# Patient Record
Sex: MALE | Race: WHITE
[De-identification: names, ages, dates, MRNs, and addresses within clinical notes are randomized per-mention and may not be internally consistent; named-entity substitution may affect disease eponyms.]

---

## 2018-06-04 ENCOUNTER — HOSPITAL ENCOUNTER (OUTPATIENT)
Dept: HOSPITAL 8 - CFH | Age: 58
Discharge: HOME | End: 2018-06-04
Attending: NURSE PRACTITIONER
Payer: MEDICARE

## 2018-06-04 DIAGNOSIS — M51.36: Primary | ICD-10-CM

## 2018-06-04 PROCEDURE — 73523 X-RAY EXAM HIPS BI 5/> VIEWS: CPT

## 2018-06-04 PROCEDURE — 72114 X-RAY EXAM L-S SPINE BENDING: CPT

## 2019-11-13 ENCOUNTER — HOSPITAL ENCOUNTER (EMERGENCY)
Dept: HOSPITAL 8 - ED | Age: 59
Discharge: HOME | End: 2019-11-13
Payer: MEDICARE

## 2019-11-13 VITALS — DIASTOLIC BLOOD PRESSURE: 88 MMHG | SYSTOLIC BLOOD PRESSURE: 156 MMHG

## 2019-11-13 VITALS — BODY MASS INDEX: 28 KG/M2 | WEIGHT: 195.55 LBS | HEIGHT: 70 IN

## 2019-11-13 DIAGNOSIS — R42: Primary | ICD-10-CM

## 2019-11-13 DIAGNOSIS — F12.90: ICD-10-CM

## 2019-11-13 DIAGNOSIS — I10: ICD-10-CM

## 2019-11-13 DIAGNOSIS — E11.9: ICD-10-CM

## 2019-11-13 DIAGNOSIS — Z90.89: ICD-10-CM

## 2019-11-13 DIAGNOSIS — F17.200: ICD-10-CM

## 2019-11-13 DIAGNOSIS — E78.5: ICD-10-CM

## 2019-11-13 LAB
ALBUMIN SERPL-MCNC: 4.1 G/DL (ref 3.4–5)
ANION GAP SERPL CALC-SCNC: 5 MMOL/L (ref 5–15)
BASOPHILS # BLD AUTO: 0.06 X10^3/UL (ref 0–0.1)
BASOPHILS NFR BLD AUTO: 1 % (ref 0–1)
CALCIUM SERPL-MCNC: 9.3 MG/DL (ref 8.5–10.1)
CHLORIDE SERPL-SCNC: 118 MMOL/L (ref 98–107)
CREAT SERPL-MCNC: 1.78 MG/DL (ref 0.7–1.3)
EOSINOPHIL # BLD AUTO: 0.33 X10^3/UL (ref 0–0.4)
EOSINOPHIL NFR BLD AUTO: 3 % (ref 1–7)
ERYTHROCYTE [DISTWIDTH] IN BLOOD BY AUTOMATED COUNT: 15.7 % (ref 9.4–14.8)
LYMPHOCYTES # BLD AUTO: 2.37 X10^3/UL (ref 1–3.4)
LYMPHOCYTES NFR BLD AUTO: 25 % (ref 22–44)
MCH RBC QN AUTO: 27.6 PG (ref 27.5–34.5)
MCHC RBC AUTO-ENTMCNC: 32.2 G/DL (ref 33.2–36.2)
MCV RBC AUTO: 85.9 FL (ref 81–97)
MD: NO
MONOCYTES # BLD AUTO: 0.41 X10^3/UL (ref 0.2–0.8)
MONOCYTES NFR BLD AUTO: 4 % (ref 2–9)
NEUTROPHILS # BLD AUTO: 6.42 X10^3/UL (ref 1.8–6.8)
NEUTROPHILS NFR BLD AUTO: 67 % (ref 42–75)
PLATELET # BLD AUTO: 164 X10^3/UL (ref 130–400)
PMV BLD AUTO: 9.2 FL (ref 7.4–10.4)
RBC # BLD AUTO: 6.8 X10^6/UL (ref 4.38–5.82)

## 2019-11-13 PROCEDURE — 80048 BASIC METABOLIC PNL TOTAL CA: CPT

## 2019-11-13 PROCEDURE — 93005 ELECTROCARDIOGRAM TRACING: CPT

## 2019-11-13 PROCEDURE — 36415 COLL VENOUS BLD VENIPUNCTURE: CPT

## 2019-11-13 PROCEDURE — 85025 COMPLETE CBC W/AUTO DIFF WBC: CPT

## 2019-11-13 PROCEDURE — 82040 ASSAY OF SERUM ALBUMIN: CPT

## 2019-11-13 PROCEDURE — 99284 EMERGENCY DEPT VISIT MOD MDM: CPT

## 2019-11-13 PROCEDURE — 70551 MRI BRAIN STEM W/O DYE: CPT

## 2019-11-13 NOTE — NUR
[facility] FROM HOME. APPROX 1 HOUR AGO +DIZZY, +NAUSEOUS, VOMITED A LITTLE. 
SMOKED MARIJUANA APPROX 45 MIN PRIOR TO EPISODE. STS NO CHANGE IN TYPE OF 
MARIJUANA.  NO LOC, DIAPHORETIC ON ARRIVAL, COOL TO TOUCH, DENIES PAIN. 
DR. GUZMAN IN ROOM FOR EVAL. PT IS PEARRL, EQUAL STRENGTH IN ALL EXTREMITIES. 
NO FACIAL DROOP. NO SLURRED SPEECH. DENEIS CP/SOB. LUNGS CTAB. ABD SNT ROUND 
AND BS X4, DENIES PAIN. 

PLAN FOR LABS MEDS AND MRI

PT HELPED OOB TO BATHROOM 2 STAFF ASSIST. GAIT UNSTEADY, C/O DIZZINESS. 

FALL PRECAUTIONS IN PLACE. AS

## 2019-12-10 ENCOUNTER — HOSPITAL ENCOUNTER (OUTPATIENT)
Dept: HOSPITAL 8 - CFH | Age: 59
Discharge: HOME | End: 2019-12-10
Attending: NURSE PRACTITIONER
Payer: MEDICARE

## 2019-12-10 DIAGNOSIS — I10: ICD-10-CM

## 2019-12-10 DIAGNOSIS — I35.1: Primary | ICD-10-CM

## 2019-12-10 DIAGNOSIS — E78.5: ICD-10-CM

## 2019-12-10 DIAGNOSIS — H53.2: ICD-10-CM

## 2019-12-10 DIAGNOSIS — E11.9: ICD-10-CM

## 2019-12-10 PROCEDURE — 93880 EXTRACRANIAL BILAT STUDY: CPT

## 2019-12-10 PROCEDURE — 93306 TTE W/DOPPLER COMPLETE: CPT

## 2019-12-24 ENCOUNTER — HOSPITAL ENCOUNTER (OUTPATIENT)
Dept: HOSPITAL 8 - RAD | Age: 59
Discharge: HOME | End: 2019-12-24
Attending: INTERNAL MEDICINE
Payer: MEDICARE

## 2019-12-24 DIAGNOSIS — E11.9: ICD-10-CM

## 2019-12-24 DIAGNOSIS — F12.10: ICD-10-CM

## 2019-12-24 DIAGNOSIS — I45.10: Primary | ICD-10-CM

## 2019-12-24 DIAGNOSIS — I10: ICD-10-CM

## 2019-12-24 DIAGNOSIS — F17.200: ICD-10-CM

## 2019-12-24 PROCEDURE — 93017 CV STRESS TEST TRACING ONLY: CPT

## 2019-12-24 PROCEDURE — 78452 HT MUSCLE IMAGE SPECT MULT: CPT

## 2019-12-24 PROCEDURE — A9502 TC99M TETROFOSMIN: HCPCS

## 2020-01-27 ENCOUNTER — HOSPITAL ENCOUNTER (OUTPATIENT)
Dept: HOSPITAL 8 - CFH | Age: 60
Discharge: HOME | End: 2020-01-27
Attending: NURSE PRACTITIONER
Payer: MEDICARE

## 2020-01-27 DIAGNOSIS — E11.22: ICD-10-CM

## 2020-01-27 DIAGNOSIS — E11.59: ICD-10-CM

## 2020-01-27 DIAGNOSIS — N26.1: Primary | ICD-10-CM

## 2020-01-27 DIAGNOSIS — I16.0: ICD-10-CM

## 2020-01-27 DIAGNOSIS — N18.3: ICD-10-CM

## 2020-01-27 PROCEDURE — 93975 VASCULAR STUDY: CPT

## 2020-02-20 ENCOUNTER — HOSPITAL ENCOUNTER (OUTPATIENT)
Dept: HOSPITAL 8 - CFH | Age: 60
Discharge: HOME | End: 2020-02-20
Attending: NURSE PRACTITIONER
Payer: MEDICARE

## 2020-02-20 DIAGNOSIS — J92.9: ICD-10-CM

## 2020-02-20 DIAGNOSIS — I70.8: ICD-10-CM

## 2020-02-20 DIAGNOSIS — N20.0: Primary | ICD-10-CM

## 2020-02-20 DIAGNOSIS — N26.1: ICD-10-CM

## 2020-02-20 DIAGNOSIS — I70.0: ICD-10-CM

## 2020-02-20 DIAGNOSIS — K80.20: ICD-10-CM

## 2020-02-20 PROCEDURE — 74175 CTA ABDOMEN W/CONTRAST: CPT

## 2021-03-01 ENCOUNTER — HOSPITAL ENCOUNTER (EMERGENCY)
Dept: HOSPITAL 8 - ED | Age: 61
Discharge: HOME | End: 2021-03-01
Payer: MEDICARE

## 2021-03-01 VITALS — WEIGHT: 193.35 LBS | BODY MASS INDEX: 27.68 KG/M2 | HEIGHT: 70 IN

## 2021-03-01 VITALS — DIASTOLIC BLOOD PRESSURE: 91 MMHG | SYSTOLIC BLOOD PRESSURE: 187 MMHG

## 2021-03-01 DIAGNOSIS — Z90.49: ICD-10-CM

## 2021-03-01 DIAGNOSIS — S39.012A: Primary | ICD-10-CM
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
## 2021-03-01 DIAGNOSIS — F17.210: ICD-10-CM

## 2021-03-01 DIAGNOSIS — E11.65: ICD-10-CM
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
## 2021-03-01 DIAGNOSIS — E78.5: ICD-10-CM

## 2021-03-01 DIAGNOSIS — M51.36: ICD-10-CM
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
## 2021-03-01 DIAGNOSIS — M19.90: ICD-10-CM

## 2021-03-01 PROCEDURE — 72110 X-RAY EXAM L-2 SPINE 4/>VWS: CPT

## 2021-03-01 PROCEDURE — 99283 EMERGENCY DEPT VISIT LOW MDM: CPT

## 2021-03-01 NOTE — NUR
RIGHT PARASPINAL LUMBAR PAIN WITHOUT KNOWN CAUSE

 NO ANDRZEJ/BLADDER ABNORMALITIES. SMX UNREMARKABLE TO JACEK LE

## 2021-06-02 ENCOUNTER — HOSPITAL ENCOUNTER (OUTPATIENT)
Dept: HOSPITAL 8 - CFH | Age: 61
Discharge: HOME | End: 2021-06-02
Attending: CLINICAL NURSE SPECIALIST
Payer: MEDICARE

## 2021-06-02 DIAGNOSIS — M51.36: Primary | ICD-10-CM

## 2021-06-02 DIAGNOSIS — M47.816: ICD-10-CM

## 2021-06-02 DIAGNOSIS — M51.27: ICD-10-CM

## 2021-06-02 DIAGNOSIS — M48.07: ICD-10-CM

## 2021-06-02 PROCEDURE — 72148 MRI LUMBAR SPINE W/O DYE: CPT
